# Patient Record
Sex: FEMALE | Race: WHITE | NOT HISPANIC OR LATINO | Employment: OTHER | URBAN - METROPOLITAN AREA
[De-identification: names, ages, dates, MRNs, and addresses within clinical notes are randomized per-mention and may not be internally consistent; named-entity substitution may affect disease eponyms.]

---

## 2016-02-27 LAB
CREAT ?TM UR-SCNC: 18 UMOL/L
MICROALBUMIN UR-MCNC: 0.2 MG/L (ref 0–20)
MICROALBUMIN/CREAT UR: 0 MG/G{CREAT}

## 2016-09-03 LAB — HBA1C MFR BLD HPLC: 6.4 %

## 2017-03-18 LAB
CREAT ?TM UR-SCNC: 15 UMOL/L
HBA1C MFR BLD HPLC: 6.5 %
MICROALBUMIN UR-MCNC: 0.2 MG/L (ref 0–20)
MICROALBUMIN/CREAT UR: 0 MG/G{CREAT}

## 2017-09-16 LAB — HBA1C MFR BLD HPLC: 6.1 %

## 2018-02-27 LAB — HBA1C MFR BLD HPLC: 6.3 %

## 2018-03-01 NOTE — TELEPHONE ENCOUNTER
Patient had to get new meter and needs strips, One Touch Verio IQ- testing 3 times daily   Please send to [facility]

## 2018-03-02 DIAGNOSIS — E11.9 TYPE 2 DIABETES MELLITUS WITHOUT COMPLICATION, WITHOUT LONG-TERM CURRENT USE OF INSULIN (HCC): Primary | ICD-10-CM

## 2018-03-09 ENCOUNTER — TELEPHONE (OUTPATIENT)
Dept: ENDOCRINOLOGY | Facility: HOSPITAL | Age: 73
End: 2018-03-09

## 2018-03-09 DIAGNOSIS — E11.9 TYPE 2 DIABETES MELLITUS WITHOUT COMPLICATION, WITHOUT LONG-TERM CURRENT USE OF INSULIN (HCC): ICD-10-CM

## 2018-03-09 NOTE — TELEPHONE ENCOUNTER
We sent test strips over for her Onetouch Verio Meter  The pt has a ONETOUCH VERIO FLEX meter though  She said the strips we sent wont work in the meter she has  I can't get the flex to come up so do you mind if I call the pharmacy and give a verbal for her?

## 2018-03-09 NOTE — TELEPHONE ENCOUNTER
They wont let us give a verbal due to her insurance  I'll resend the script with the type of strips in the comments  The script that they got from us only says blood glucose strips so they gave the pt Onetouch Ultra

## 2018-03-11 RX ORDER — BLOOD SUGAR DIAGNOSTIC
1 STRIP MISCELLANEOUS
Qty: 100 EACH | Refills: 0 | Status: SHIPPED | OUTPATIENT
Start: 2018-03-11 | End: 2018-04-26 | Stop reason: SDUPTHER

## 2018-04-13 LAB
CREAT ?TM UR-SCNC: 95 UMOL/L
HBA1C MFR BLD HPLC: 6.1 %
MICROALBUMIN UR-MCNC: 0.5 MG/L (ref 0–20)
MICROALBUMIN/CREAT UR: 5 MG/G{CREAT}

## 2018-04-26 ENCOUNTER — OFFICE VISIT (OUTPATIENT)
Dept: ENDOCRINOLOGY | Facility: HOSPITAL | Age: 73
End: 2018-04-26
Payer: MEDICARE

## 2018-04-26 VITALS
HEART RATE: 66 BPM | SYSTOLIC BLOOD PRESSURE: 122 MMHG | BODY MASS INDEX: 23.45 KG/M2 | DIASTOLIC BLOOD PRESSURE: 70 MMHG | WEIGHT: 149.4 LBS | HEIGHT: 67 IN

## 2018-04-26 DIAGNOSIS — E11.40 TYPE 2 DIABETES MELLITUS WITH DIABETIC NEUROPATHY, WITHOUT LONG-TERM CURRENT USE OF INSULIN (HCC): Primary | ICD-10-CM

## 2018-04-26 DIAGNOSIS — E11.9 TYPE 2 DIABETES MELLITUS WITHOUT COMPLICATION, WITHOUT LONG-TERM CURRENT USE OF INSULIN (HCC): ICD-10-CM

## 2018-04-26 DIAGNOSIS — E03.8 HYPOTHYROIDISM DUE TO HASHIMOTO'S THYROIDITIS: ICD-10-CM

## 2018-04-26 DIAGNOSIS — E06.3 HYPOTHYROIDISM DUE TO HASHIMOTO'S THYROIDITIS: ICD-10-CM

## 2018-04-26 PROBLEM — E11.49 TYPE 2 DIABETES MELLITUS WITH NEUROLOGIC COMPLICATION, WITHOUT LONG-TERM CURRENT USE OF INSULIN (HCC): Status: ACTIVE | Noted: 2018-04-26

## 2018-04-26 PROBLEM — I10 HYPERTENSION: Status: ACTIVE | Noted: 2018-04-26

## 2018-04-26 PROBLEM — E78.5 HYPERLIPIDEMIA: Status: ACTIVE | Noted: 2018-04-26

## 2018-04-26 PROCEDURE — 99214 OFFICE O/P EST MOD 30 MIN: CPT | Performed by: INTERNAL MEDICINE

## 2018-04-26 RX ORDER — METFORMIN HYDROCHLORIDE 500 MG/1
1 TABLET, EXTENDED RELEASE ORAL 2 TIMES DAILY WITH MEALS
COMMUNITY
Start: 2017-01-12 | End: 2018-04-26 | Stop reason: SDUPTHER

## 2018-04-26 RX ORDER — LEVOTHYROXINE SODIUM 112 UG/1
1 TABLET ORAL
COMMUNITY
End: 2018-04-26 | Stop reason: CLARIF

## 2018-04-26 RX ORDER — ROSUVASTATIN CALCIUM 5 MG/1
5 TABLET, COATED ORAL DAILY
COMMUNITY
Start: 2017-08-28

## 2018-04-26 RX ORDER — LEVOTHYROXINE SODIUM 112 MCG
112 TABLET ORAL DAILY
Qty: 40 TABLET | Refills: 6 | Status: SHIPPED | OUTPATIENT
Start: 2018-04-26 | End: 2019-01-08 | Stop reason: SDUPTHER

## 2018-04-26 RX ORDER — METFORMIN HYDROCHLORIDE 500 MG/1
500 TABLET, EXTENDED RELEASE ORAL 2 TIMES DAILY WITH MEALS
Qty: 60 TABLET | Refills: 6 | Status: SHIPPED | OUTPATIENT
Start: 2018-04-26

## 2018-04-26 RX ORDER — BLOOD SUGAR DIAGNOSTIC
1 STRIP MISCELLANEOUS
Qty: 100 EACH | Refills: 6 | Status: SHIPPED | OUTPATIENT
Start: 2018-04-26 | End: 2019-09-21 | Stop reason: SDUPTHER

## 2018-04-26 RX ORDER — LEVOTHYROXINE SODIUM 112 UG/1
112 TABLET ORAL DAILY
COMMUNITY
End: 2018-04-26 | Stop reason: SDUPTHER

## 2018-04-26 RX ORDER — RAMIPRIL 5 MG/1
5 CAPSULE ORAL 2 TIMES DAILY
COMMUNITY
Start: 2018-03-31

## 2018-04-26 NOTE — LETTER
April 26, 2018     Brunilda Duque  [address]    Patient: Rylie Sharp   YOB: 1945   Date of Visit: 4/26/2018       Dear Dr Dee Collins:    Thank you for referring Rylie Sharp to me for evaluation  Below are my notes for this consultation  If you have questions, please do not hesitate to call me  I look forward to following your patient along with you  Sincerely,        Jan Kuhn MD        CC: MD Jan Delong MD  4/26/2018  1:07 PM  Sign at close encounter  4/26/2018    Assessment/Plan      There are no diagnoses linked to this encounter  Assessment/Plan:  ***      CC: Diabetes Consult    History of Present Illness     HPI: Rylie Sharp is a 71y o  year old female with type 2 diabetes for 17 years  She was diagnosed with type 2 diabetes in 2001 while in the hospital for chest pain and an abnormal EKG was noted  At that point she was diagnosed with a silent MI and diabetes was discovered  She is on oral agents at home and takes metformin  mg twice a day  She denies any polyuria, polydipsia, polyhagia, and blurry vision  She has up to once a night nocturia  She denies numbness or tingling of her feet  She has no chest pain or shortness of breath  She denies nephropathy, retinopathy, stroke and claudication but does admit to neuropathy and heart attack  [place] in 2017 and hurt self  She was found to have hypothyroidism in 2003 and started on thyroid hormone  She is currently on Synthroid brand 112 mcg 1 tablet 3 days a week and 1-1/2 tablets 4 days a week  She has some tremors and fatigue  She denies cold or heat intolerance, anxiety, changes in weight, palpitation, diarrhea, or constipation  She has dry skin, but no brittle nails or hair loss  Hypoglycemic episodes: No never    H/o of hypoglycemia causing hospitalization or intervention such as glucagon injection  or ambulance call  No   Hypoglycemia symptoms: hasn't recently had one   Treatment of hypoglycemia: would eat food  Glucagon:No   Medic alert tag: recommended,Yes  The patient's last eye exam was in 2016 with no retinopathy  The patient's last foot exam was in not seen  Last A1C was     Blood Sugar/Glucometer/Pump/CGM review: checks blood sugars 3 times a week  Mostly low 100s  Before breakfast:   Before lunch:   Before dinner:   Bedtime:       Review of Systems   Constitutional: Positive for fatigue  Negative for unexpected weight change  Fatigue by the end of a long work day  HENT: Positive for hearing loss  Negative for ear pain, tinnitus and trouble swallowing  Has hearing aides  Eyes: Negative for visual disturbance  Respiratory: Negative for chest tightness and shortness of breath  Cardiovascular: Negative for chest pain, palpitations and leg swelling  Last cardiology visit was last week  Gastrointestinal: Negative for abdominal pain, constipation, diarrhea and nausea  Will get loose bowels if eat wrong food  Endocrine: Negative for cold intolerance, heat intolerance, polydipsia, polyphagia and polyuria  Occasional nocturia once a night  Musculoskeletal: Positive for arthralgias  Negative for back pain  Some right forearm pain and left shoulder pain  Skin: Negative for rash and wound  Has dry skin, but no brittle nails or hair loss  Neurological: Positive for tremors  Negative for dizziness, light-headedness, numbness and headaches  Tingling in left 2nd toe after previous injury  Psychiatric/Behavioral: Negative for dysphoric mood and sleep disturbance  The patient is not nervous/anxious  Historical Information   No past medical history on file  No past surgical history on file    Social History   History   Alcohol use Not on file     History   Drug use: Unknown     History   Smoking Status    Current Every Day Smoker    Types: Cigarettes   Smokeless Tobacco    Never Used     Family History:   Family History   Problem Relation Age of Onset    Alzheimer's disease Mother     Heart attack Father        Meds/Allergies   Current Outpatient Prescriptions   Medication Sig Dispense Refill    aspirin 81 MG tablet Take 1 tablet by mouth      levothyroxine 112 mcg tablet Take 1 tablet by mouth      metFORMIN (GLUCOPHAGE-XR) 500 mg 24 hr tablet Take 1 tablet by mouth      ONETOUCH VERIO test strip 1 each by Other route 3 (three) times a day before meals ONE TOUCH VERIO STRIPS  DX E11 9 100 each 0    ramipril (ALTACE) 5 mg capsule       rosuvastatin (CRESTOR) 5 mg tablet Take 5 mg by mouth       No current facility-administered medications for this visit  Allergies   Allergen Reactions    Alendronate      Other reaction(s): Nausea/Vomiting    Cefprozil      Other reaction(s): Nausea/Vomiting    Cephalexin      Other reaction(s): Nausea/Vomiting    Dipyridamole      Other reaction(s): Respiratory Distress    Lactose      Annotation: Intolerant  (by Leesa Barber on 30-Jan-2013);    Pravastatin     Simvastatin      Other reaction(s): Nausea/Vomiting    Statins      Other reaction(s): Other (see comments)  muscle pain    Clopidogrel Rash       Objective   Vitals: Blood pressure 122/70, pulse 66, height 5' 6 5" (1 689 m), weight 67 8 kg (149 lb 6 4 oz)  Invasive Devices          No matching active lines, drains, or airways          Physical Exam   Constitutional: She is oriented to person, place, and time  She appears well-developed and well-nourished  HENT:   Head: Normocephalic and atraumatic  Eyes: Conjunctivae and EOM are normal  Pupils are equal, round, and reactive to light  No lid lag, stare, proptosis, or periorbital edema  Neck: Normal range of motion  Neck supple  No thyromegaly present  No carotid bruits  Cardiovascular: Normal rate, regular rhythm, normal heart sounds and intact distal pulses  No murmur heard    Pulmonary/Chest: Effort normal and breath sounds normal  She has no wheezes  Abdominal: Soft  Bowel sounds are normal  There is no tenderness  Musculoskeletal: Normal range of motion  She exhibits no edema or deformity  Hammer toe of right 2nd toe  No ulcerations of the feet  No tremor of the outstretched hands  No spinous process tenderness  No CVAT  Lymphadenopathy:     She has no cervical adenopathy  Neurological: She is alert and oriented to person, place, and time  She has normal reflexes  Vibration sensation diminished slightly to the bilateral 1st toe DIP  Skin: Skin is warm and dry  No rash noted  Vitals reviewed  The history was obtained from the review of the chart and from the patient  Lab Results:      No results found for this or any previous visit (from the past 416 [id number] hour(s))  No future appointments

## 2018-04-26 NOTE — PATIENT INSTRUCTIONS
No change in the thyroid medicine or metformin  I will work on getting the blood work done last week  Follow up in 6 months with blood work

## 2018-04-26 NOTE — PROGRESS NOTES
4/26/2018    Assessment/Plan      Diagnoses and all orders for this visit:    Type 2 diabetes mellitus with diabetic neuropathy, without long-term current use of insulin (HCC)  -     Microalbumin / creatinine urine ratio Lab Collect; Future  -     HEMOGLOBIN A1C W/ EAG ESTIMATION Lab Collect; Future  -     Comprehensive metabolic panel Lab Collect; Future  -     metFORMIN (GLUCOPHAGE-XR) 500 mg 24 hr tablet; Take 1 tablet (500 mg total) by mouth 2 (two) times a day with meals    Hypothyroidism due to Hashimoto's thyroiditis  -     T4, free Lab Collect; Future  -     TSH, 3rd generation Lab Collect; Future  -     SYNTHROID 112 MCG tablet; Take 1 tablet (112 mcg total) by mouth daily 1 tablet 3 times a week and 1 1/2 tablets 4 times a week    Type 2 diabetes mellitus without complication, without long-term current use of insulin (HCC)  -     ONETOUCH VERIO test strip; 1 each by Other route 3 (three) times a day before meals ONE TOUCH VERIO STRIPS  DX E11 9    Other orders  -     aspirin 81 MG tablet; Take 1 tablet by mouth  -     Discontinue: levothyroxine 112 mcg tablet; Take 1 tablet by mouth  -     Discontinue: metFORMIN (GLUCOPHAGE-XR) 500 mg 24 hr tablet; Take 1 tablet by mouth 2 (two) times a day with meals    -     ramipril (ALTACE) 5 mg capsule; Take 5 mg by mouth 2 (two) times a day    -     rosuvastatin (CRESTOR) 5 mg tablet; Take 5 mg by mouth daily    -     Discontinue: levothyroxine (SYNTHROID) 112 mcg tablet; Take 112 mcg by mouth daily 1 tablet 3 times a week and 1 1/2 tablets 4 times a week        Assessment/Plan:  1  Type 2 diabetes  Most recent hemoglobin A1c is excellent  She will continue the same metformin  mg twice a day  She will continue to check her blood sugars up to once a day or so  2   Diabetic neuropathy  She has no symptoms of diabetic neuropathy, just signs on exam   I will follow these signs over time  3   Hypothyroidism due to Hashimoto's thyroiditis    TSH is slightly below normal at 0 37 with normal 0 4  She is asymptomatic  I will leave her on the same dose of Synthroid 112 mcg 1 tablet for 3 days a week and 1/2 tablets 4 days a week  I will see her in 6 months with preceding hemoglobin A1c, CMP, TSH, free T4, and urine microalbumin to creatinine ratio  CC: Diabetes Consult    History of Present Illness     HPI: Sandie Tian is a 71y o  year old female with type 2 diabetes for 17 years  She was diagnosed with type 2 diabetes in 2001 while in the hospital for chest pain and an abnormal EKG was noted  At that point she was diagnosed with a silent MI and diabetes was discovered  She is on oral agents at home and takes metformin  mg twice a day  She denies any polyuria, polydipsia, polyhagia, and blurry vision  She has up to once a night nocturia  She denies numbness or tingling of her feet  She has no chest pain or shortness of breath  She denies nephropathy, retinopathy, stroke and claudication but does admit to neuropathy and heart attack  [place] in 2017 and hurt self  She was found to have hypothyroidism in 2003 and started on thyroid hormone  She is currently on Synthroid brand 112 mcg 1 tablet 3 days a week and 1-1/2 tablets 4 days a week  She has some tremors and fatigue  She denies cold or heat intolerance, anxiety, changes in weight, palpitation, diarrhea, or constipation  She has dry skin, but no brittle nails or hair loss  Hypoglycemic episodes: No never  H/o of hypoglycemia causing hospitalization or intervention such as glucagon injection  or ambulance call  No   Hypoglycemia symptoms: hasn't recently had one  Treatment of hypoglycemia: would eat food  Glucagon:No   Medic alert tag: recommended,Yes  The patient's last eye exam was in 2016 with no retinopathy  The patient's last foot exam was in not seen  Last A1C was     Blood Sugar/Glucometer/Pump/CGM review: checks blood sugars 3 times a week  Mostly low 100s    Before breakfast: Before lunch:   Before dinner:   Bedtime:       Review of Systems   Constitutional: Positive for fatigue  Negative for unexpected weight change  Fatigue by the end of a long work day  HENT: Positive for hearing loss  Negative for ear pain, tinnitus and trouble swallowing  Has hearing aides  Eyes: Negative for visual disturbance  Respiratory: Negative for chest tightness and shortness of breath  Cardiovascular: Negative for chest pain, palpitations and leg swelling  Last cardiology visit was last week  Gastrointestinal: Negative for abdominal pain, constipation, diarrhea and nausea  Will get loose bowels if eat wrong food  Endocrine: Negative for cold intolerance, heat intolerance, polydipsia, polyphagia and polyuria  Occasional nocturia once a night  Musculoskeletal: Positive for arthralgias  Negative for back pain  Some right forearm pain and left shoulder pain  Skin: Negative for rash and wound  Has dry skin, but no brittle nails or hair loss  Neurological: Positive for tremors  Negative for dizziness, light-headedness, numbness and headaches  Tingling in left 2nd toe after previous injury  Psychiatric/Behavioral: Negative for dysphoric mood and sleep disturbance  The patient is not nervous/anxious          Historical Information   Past Medical History:   Diagnosis Date    Coronary heart disease     Heart attack ([address] )     Lactose intolerance     Psoriasis      Past Surgical History:   Procedure Laterality Date    BREAST CYST EXCISION Left     HYSTERECTOMY      SALPINGOOPHORECTOMY Left     TUBAL LIGATION Bilateral      Social History   History   Alcohol Use    Yes     Comment: ocassional beer     History   Drug Use No     History   Smoking Status    Current Every Day Smoker    Types: Cigarettes   Smokeless Tobacco    Never Used     Family History:   Family History   Problem Relation Age of Onset    Alzheimer's disease Mother  Diabetes unspecified Father     Heart attack Father 55    Diabetes type II Sister     Hyperlipidemia Sister     Breast cancer Sister     No Known Problems Daughter     Diabetes type II Son        Meds/Allergies   Current Outpatient Prescriptions   Medication Sig Dispense Refill    aspirin 81 MG tablet Take 1 tablet by mouth      metFORMIN (GLUCOPHAGE-XR) 500 mg 24 hr tablet Take 1 tablet (500 mg total) by mouth 2 (two) times a day with meals 60 tablet 6    ONETOUCH VERIO test strip 1 each by Other route 3 (three) times a day before meals ONE TOUCH VERIO STRIPS  DX E11 9 100 each 6    ramipril (ALTACE) 5 mg capsule Take 5 mg by mouth 2 (two) times a day        rosuvastatin (CRESTOR) 5 mg tablet Take 5 mg by mouth daily        SYNTHROID 112 MCG tablet Take 1 tablet (112 mcg total) by mouth daily 1 tablet 3 times a week and 1 1/2 tablets 4 times a week 40 tablet 6     No current facility-administered medications for this visit  Allergies   Allergen Reactions    Alendronate      Other reaction(s): Nausea/Vomiting    Cefprozil      Other reaction(s): Nausea/Vomiting    Cephalexin      Other reaction(s): Nausea/Vomiting    Dipyridamole      Other reaction(s): Respiratory Distress    Lactose      Annotation: Intolerant  (by Leesa Barber on 30-Jan-2013);    Pravastatin     Simvastatin      Other reaction(s): Nausea/Vomiting    Statins      Other reaction(s): Other (see comments)  muscle pain    Clopidogrel Rash       Objective   Vitals: Blood pressure 122/70, pulse 66, height 5' 6 5" (1 689 m), weight 67 8 kg (149 lb 6 4 oz)  Invasive Devices          No matching active lines, drains, or airways          Physical Exam   Constitutional: She is oriented to person, place, and time  She appears well-developed and well-nourished  HENT:   Head: Normocephalic and atraumatic  Eyes: Conjunctivae and EOM are normal  Pupils are equal, round, and reactive to light     No lid lag, stare, proptosis, or periorbital edema  Neck: Normal range of motion  Neck supple  No thyromegaly present  No carotid bruits  Cardiovascular: Normal rate, regular rhythm, normal heart sounds and intact distal pulses  No murmur heard  Pulmonary/Chest: Effort normal and breath sounds normal  She has no wheezes  Abdominal: Soft  Bowel sounds are normal  There is no tenderness  Musculoskeletal: Normal range of motion  She exhibits no edema or deformity  Hammer toe of right 2nd toe  No ulcerations of the feet  No tremor of the outstretched hands  No spinous process tenderness  No CVAT  Lymphadenopathy:     She has no cervical adenopathy  Neurological: She is alert and oriented to person, place, and time  She has normal reflexes  Vibration sensation diminished slightly to the bilateral 1st toe DIP  Skin: Skin is warm and dry  No rash noted  Vitals reviewed  The history was obtained from the review of the chart and from the patient  Lab Results:   Blood work on 04/13/2018 showed hemoglobin A1c of 6 1%  Urine microalbumin to creatinine ratio was 5  CMP showed a glucose of 73 random but was otherwise normal   TSH is 0 37 with a free T4 of 1 5  On 04/13/2018, CBC was normal   No results found for this or any previous visit (from the past 46371 hour(s))        Future Appointments  Date Time Provider Yulisa Roman   10/26/2018 9:15 AM AMAYA Schneider [facility]

## 2018-04-26 NOTE — LETTER
April 26, 2018     Piyush Moran  [address]    Patient: Steph Dick   YOB: 1945   Date of Visit: 4/26/2018       Dear Dr Hakeem Lemus:    Thank you for referring Steph Dick to me for evaluation  Below are my notes for this consultation  If you have questions, please do not hesitate to call me  I look forward to following your patient along with you  Sincerely,        Mery Hernandez MD        CC: MD Mery Kwon MD  4/26/2018  2:57 PM  Sign at close encounter  4/26/2018    Assessment/Plan      Diagnoses and all orders for this visit:    Type 2 diabetes mellitus with diabetic neuropathy, without long-term current use of insulin ([facility] )  -     Microalbumin / creatinine urine ratio Lab Collect; Future  -     HEMOGLOBIN A1C W/ EAG ESTIMATION Lab Collect; Future  -     Comprehensive metabolic panel Lab Collect; Future  -     metFORMIN (GLUCOPHAGE-XR) 500 mg 24 hr tablet; Take 1 tablet (500 mg total) by mouth 2 (two) times a day with meals    Hypothyroidism due to Hashimoto's thyroiditis  -     T4, free Lab Collect; Future  -     TSH, 3rd generation Lab Collect; Future  -     SYNTHROID 112 MCG tablet; Take 1 tablet (112 mcg total) by mouth daily 1 tablet 3 times a week and 1 1/2 tablets 4 times a week    Type 2 diabetes mellitus without complication, without long-term current use of insulin ([facility])  -     ONETOUCH VERIO test strip; 1 each by Other route 3 (three) times a day before meals ONE TOUCH VERIO STRIPS  DX E11 9    Other orders  -     aspirin 81 MG tablet; Take 1 tablet by mouth  -     Discontinue: levothyroxine 112 mcg tablet; Take 1 tablet by mouth  -     Discontinue: metFORMIN (GLUCOPHAGE-XR) 500 mg 24 hr tablet; Take 1 tablet by mouth 2 (two) times a day with meals    -     ramipril (ALTACE) 5 mg capsule; Take 5 mg by mouth 2 (two) times a day    -     rosuvastatin (CRESTOR) 5 mg tablet;  Take 5 mg by mouth daily    -     Discontinue: levothyroxine (SYNTHROID) 112 mcg tablet; Take 112 mcg by mouth daily 1 tablet 3 times a week and 1 1/2 tablets 4 times a week        Assessment/Plan:  1  Type 2 diabetes  Most recent hemoglobin A1c is excellent  She will continue the same metformin  mg twice a day  She will continue to check her blood sugars up to once a day or so  2   Diabetic neuropathy  She has no symptoms of diabetic neuropathy, just signs on exam   I will follow these signs over time  3   Hypothyroidism due to Hashimoto's thyroiditis  TSH is slightly below normal at 0 37 with normal 0 4  She is asymptomatic  I will leave her on the same dose of Synthroid 112 mcg 1 tablet for 3 days a week and 1/2 tablets 4 days a week  I will see her in 6 months with preceding hemoglobin A1c, CMP, TSH, free T4, and urine microalbumin to creatinine ratio  CC: Diabetes Consult    History of Present Illness     HPI: Bobbi Pollack is a 71y o  year old female with type 2 diabetes for 17 years  She was diagnosed with type 2 diabetes in 2001 while in the hospital for chest pain and an abnormal EKG was noted  At that point she was diagnosed with a silent MI and diabetes was discovered  She is on oral agents at home and takes metformin  mg twice a day  She denies any polyuria, polydipsia, polyhagia, and blurry vision  She has up to once a night nocturia  She denies numbness or tingling of her feet  She has no chest pain or shortness of breath  She denies nephropathy, retinopathy, stroke and claudication but does admit to neuropathy and heart attack  Karen Ivan in 2017 and hurt self  She was found to have hypothyroidism in 2003 and started on thyroid hormone  She is currently on Synthroid brand 112 mcg 1 tablet 3 days a week and 1-1/2 tablets 4 days a week  She has some tremors and fatigue  She denies cold or heat intolerance, anxiety, changes in weight, palpitation, diarrhea, or constipation    She has dry skin, but no brittle nails or hair loss     Hypoglycemic episodes: No never  H/o of hypoglycemia causing hospitalization or intervention such as glucagon injection  or ambulance call  No   Hypoglycemia symptoms: hasn't recently had one  Treatment of hypoglycemia: would eat food  Glucagon:No   Medic alert tag: recommended,Yes  The patient's last eye exam was in 2016 with no retinopathy  The patient's last foot exam was in not seen  Last A1C was     Blood Sugar/Glucometer/Pump/CGM review: checks blood sugars 3 times a week  Mostly low 100s  Before breakfast:   Before lunch:   Before dinner:   Bedtime:       Review of Systems   Constitutional: Positive for fatigue  Negative for unexpected weight change  Fatigue by the end of a long work day  HENT: Positive for hearing loss  Negative for ear pain, tinnitus and trouble swallowing  Has hearing aides  Eyes: Negative for visual disturbance  Respiratory: Negative for chest tightness and shortness of breath  Cardiovascular: Negative for chest pain, palpitations and leg swelling  Last cardiology visit was last week  Gastrointestinal: Negative for abdominal pain, constipation, diarrhea and nausea  Will get loose bowels if eat wrong food  Endocrine: Negative for cold intolerance, heat intolerance, polydipsia, polyphagia and polyuria  Occasional nocturia once a night  Musculoskeletal: Positive for arthralgias  Negative for back pain  Some right forearm pain and left shoulder pain  Skin: Negative for rash and wound  Has dry skin, but no brittle nails or hair loss  Neurological: Positive for tremors  Negative for dizziness, light-headedness, numbness and headaches  Tingling in left 2nd toe after previous injury  Psychiatric/Behavioral: Negative for dysphoric mood and sleep disturbance  The patient is not nervous/anxious          Historical Information   Past Medical History:   Diagnosis Date    Coronary heart disease     Heart attack ([facility] [address] )     Lactose intolerance     Psoriasis      Past Surgical History:   Procedure Laterality Date    BREAST CYST EXCISION Left     HYSTERECTOMY      SALPINGOOPHORECTOMY Left     TUBAL LIGATION Bilateral      Social History   History   Alcohol Use    Yes     Comment: ocassional beer     History   Drug Use No     History   Smoking Status    Current Every Day Smoker    Types: Cigarettes   Smokeless Tobacco    Never Used     Family History:   Family History   Problem Relation Age of Onset    Alzheimer's disease Mother     Diabetes unspecified Father     Heart attack Father 55    Diabetes type II Sister     Hyperlipidemia Sister     Breast cancer Sister     No Known Problems Daughter     Diabetes type II Son        Meds/Allergies   Current Outpatient Prescriptions   Medication Sig Dispense Refill    aspirin 81 MG tablet Take 1 tablet by mouth      metFORMIN (GLUCOPHAGE-XR) 500 mg 24 hr tablet Take 1 tablet (500 mg total) by mouth 2 (two) times a day with meals 60 tablet 6    ONETOUCH VERIO test strip 1 each by Other route 3 (three) times a day before meals ONE TOUCH VERIO STRIPS  DX E11 9 100 each 6    ramipril (ALTACE) 5 mg capsule Take 5 mg by mouth 2 (two) times a day        rosuvastatin (CRESTOR) 5 mg tablet Take 5 mg by mouth daily        SYNTHROID 112 MCG tablet Take 1 tablet (112 mcg total) by mouth daily 1 tablet 3 times a week and 1 1/2 tablets 4 times a week 40 tablet 6     No current facility-administered medications for this visit  Allergies   Allergen Reactions    Alendronate      Other reaction(s): Nausea/Vomiting    Cefprozil      Other reaction(s): Nausea/Vomiting    Cephalexin      Other reaction(s): Nausea/Vomiting    Dipyridamole      Other reaction(s): Respiratory Distress    Lactose      Annotation: Intolerant   (by eLesa Barber on 30-Jan-2013);    Pravastatin     Simvastatin      Other reaction(s): Nausea/Vomiting    Statins      Other reaction(s): Other (see comments)  muscle pain    Clopidogrel Rash       Objective   Vitals: Blood pressure 122/70, pulse 66, height 5' 6 5" (1 689 m), weight 67 8 kg (149 lb 6 4 oz)  Invasive Devices          No matching active lines, drains, or airways          Physical Exam   Constitutional: She is oriented to person, place, and time  She appears well-developed and well-nourished  HENT:   Head: Normocephalic and atraumatic  Eyes: Conjunctivae and EOM are normal  Pupils are equal, round, and reactive to light  No lid lag, stare, proptosis, or periorbital edema  Neck: Normal range of motion  Neck supple  No thyromegaly present  No carotid bruits  Cardiovascular: Normal rate, regular rhythm, normal heart sounds and intact distal pulses  No murmur heard  Pulmonary/Chest: Effort normal and breath sounds normal  She has no wheezes  Abdominal: Soft  Bowel sounds are normal  There is no tenderness  Musculoskeletal: Normal range of motion  She exhibits no edema or deformity  Hammer toe of right 2nd toe  No ulcerations of the feet  No tremor of the outstretched hands  No spinous process tenderness  No CVAT  Lymphadenopathy:     She has no cervical adenopathy  Neurological: She is alert and oriented to person, place, and time  She has normal reflexes  Vibration sensation diminished slightly to the bilateral 1st toe DIP  Skin: Skin is warm and dry  No rash noted  Vitals reviewed  The history was obtained from the review of the chart and from the patient  Lab Results:   Blood work on 04/13/2018 showed hemoglobin A1c of 6 1%  Urine microalbumin to creatinine ratio was 5  CMP showed a glucose of 73 random but was otherwise normal   TSH is 0 37 with a free T4 of 1 5  On 04/13/2018, CBC was normal   No results found for this or any previous visit (from the past [id number] hour(s))        Future Appointments  Date Time Provider Yulisa Roman   10/26/2018 9:15 AM Lamont Gaucher, CRNP [facility]

## 2018-10-22 LAB
ALBUMIN SERPL-MCNC: 4.5 G/DL (ref 3.6–5.1)
ALBUMIN/CREAT UR: NORMAL MCG/MG CREAT
ALBUMIN/GLOB SERPL: 2 (CALC) (ref 1–2.5)
ALP SERPL-CCNC: 91 U/L (ref 33–130)
ALT SERPL-CCNC: 10 U/L (ref 6–29)
AST SERPL-CCNC: 15 U/L (ref 10–35)
BASOPHILS # BLD AUTO: 41 CELLS/UL (ref 0–200)
BASOPHILS NFR BLD AUTO: 0.7 %
BILIRUB SERPL-MCNC: 1 MG/DL (ref 0.2–1.2)
BUN SERPL-MCNC: 16 MG/DL (ref 7–25)
BUN/CREAT SERPL: ABNORMAL (CALC) (ref 6–22)
CALCIUM SERPL-MCNC: 9.6 MG/DL (ref 8.6–10.4)
CHLORIDE SERPL-SCNC: 107 MMOL/L (ref 98–110)
CHOLEST SERPL-MCNC: 166 MG/DL
CHOLEST/HDLC SERPL: 2.3 (CALC)
CO2 SERPL-SCNC: 26 MMOL/L (ref 20–32)
CREAT SERPL-MCNC: 0.77 MG/DL (ref 0.6–0.93)
CREAT UR-MCNC: 22 MG/DL (ref 20–275)
EOSINOPHIL # BLD AUTO: 168 CELLS/UL (ref 15–500)
EOSINOPHIL NFR BLD AUTO: 2.9 %
ERYTHROCYTE [DISTWIDTH] IN BLOOD BY AUTOMATED COUNT: 13 % (ref 11–15)
GLOBULIN SER CALC-MCNC: 2.3 G/DL (CALC) (ref 1.9–3.7)
GLUCOSE SERPL-MCNC: 113 MG/DL (ref 65–99)
HBA1C MFR BLD: 6.2 % OF TOTAL HGB
HCT VFR BLD AUTO: 41.4 % (ref 35–45)
HDLC SERPL-MCNC: 71 MG/DL
HGB BLD-MCNC: 13.7 G/DL (ref 11.7–15.5)
LDLC SERPL CALC-MCNC: 81 MG/DL (CALC)
LYMPHOCYTES # BLD AUTO: 1844 CELLS/UL (ref 850–3900)
LYMPHOCYTES NFR BLD AUTO: 31.8 %
MCH RBC QN AUTO: 29.1 PG (ref 27–33)
MCHC RBC AUTO-ENTMCNC: 33.1 G/DL (ref 32–36)
MCV RBC AUTO: 87.9 FL (ref 80–100)
MICROALBUMIN UR-MCNC: <0.2 MG/DL
MONOCYTES # BLD AUTO: 667 CELLS/UL (ref 200–950)
MONOCYTES NFR BLD AUTO: 11.5 %
NEUTROPHILS # BLD AUTO: 3080 CELLS/UL (ref 1500–7800)
NEUTROPHILS NFR BLD AUTO: 53.1 %
NONHDLC SERPL-MCNC: 95 MG/DL (CALC)
PLATELET # BLD AUTO: 269 THOUSAND/UL (ref 140–400)
PMV BLD REES-ECKER: 10.7 FL (ref 7.5–12.5)
POTASSIUM SERPL-SCNC: 4 MMOL/L (ref 3.5–5.3)
PROT SERPL-MCNC: 6.8 G/DL (ref 6.1–8.1)
RBC # BLD AUTO: 4.71 MILLION/UL (ref 3.8–5.1)
SL AMB EGFR AFRICAN AMERICAN: 89 ML/MIN/1.73M2
SL AMB EGFR NON AFRICAN AMERICAN: 77 ML/MIN/1.73M2
SODIUM SERPL-SCNC: 142 MMOL/L (ref 135–146)
T4 FREE SERPL-MCNC: 1.4 NG/DL (ref 0.8–1.8)
TRIGL SERPL-MCNC: 65 MG/DL
TSH SERPL-ACNC: 0.33 MIU/L (ref 0.4–4.5)
WBC # BLD AUTO: 5.8 THOUSAND/UL (ref 3.8–10.8)

## 2019-01-08 DIAGNOSIS — E06.3 HYPOTHYROIDISM DUE TO HASHIMOTO'S THYROIDITIS: ICD-10-CM

## 2019-01-08 DIAGNOSIS — E03.8 HYPOTHYROIDISM DUE TO HASHIMOTO'S THYROIDITIS: ICD-10-CM

## 2019-01-08 RX ORDER — LEVOTHYROXINE SODIUM 112 MCG
TABLET ORAL
Qty: 40 TABLET | Refills: 6 | Status: SHIPPED | OUTPATIENT
Start: 2019-01-08

## 2019-09-21 DIAGNOSIS — E11.9 TYPE 2 DIABETES MELLITUS WITHOUT COMPLICATION, WITHOUT LONG-TERM CURRENT USE OF INSULIN (HCC): ICD-10-CM

## 2019-09-23 RX ORDER — BLOOD SUGAR DIAGNOSTIC
STRIP MISCELLANEOUS
Qty: 100 EACH | Refills: 6 | Status: SHIPPED | OUTPATIENT
Start: 2019-09-23